# Patient Record
Sex: MALE | Race: WHITE | URBAN - METROPOLITAN AREA
[De-identification: names, ages, dates, MRNs, and addresses within clinical notes are randomized per-mention and may not be internally consistent; named-entity substitution may affect disease eponyms.]

---

## 2023-03-08 ENCOUNTER — OFFICE VISIT (OUTPATIENT)
Dept: URBAN - METROPOLITAN AREA CLINIC 10 | Facility: CLINIC | Age: 28
End: 2023-03-08
Payer: MEDICAID

## 2023-03-08 DIAGNOSIS — H57.053 TONIC PUPIL, BILATERAL: Primary | ICD-10-CM

## 2023-03-08 PROCEDURE — 92002 INTRM OPH EXAM NEW PATIENT: CPT | Performed by: OPTOMETRIST

## 2023-03-08 ASSESSMENT — INTRAOCULAR PRESSURE
OS: 22
OD: 22

## 2023-03-08 NOTE — IMPRESSION/PLAN
Impression: Tonic pupil, bilateral: H57.053. Plan: Pupils dilated. No other symptoms. No loss of accommodation. Pt is on two medications that may cause pupil dilation: Trintellix and Pregabalin. Pt has had normal neuroimaging, no trauma. Ocular health is otherwise unremarkable. F/u with PCP, see neuro-ophthalmology with changes in vision.